# Patient Record
Sex: MALE | Race: WHITE | NOT HISPANIC OR LATINO | Employment: FULL TIME | URBAN - METROPOLITAN AREA
[De-identification: names, ages, dates, MRNs, and addresses within clinical notes are randomized per-mention and may not be internally consistent; named-entity substitution may affect disease eponyms.]

---

## 2018-09-18 ENCOUNTER — TRANSFERRED RECORDS (OUTPATIENT)
Dept: HEALTH INFORMATION MANAGEMENT | Facility: CLINIC | Age: 28
End: 2018-09-18

## 2019-03-08 ENCOUNTER — TRANSFERRED RECORDS (OUTPATIENT)
Dept: HEALTH INFORMATION MANAGEMENT | Facility: CLINIC | Age: 29
End: 2019-03-08

## 2019-11-21 ENCOUNTER — OFFICE VISIT (OUTPATIENT)
Dept: FAMILY MEDICINE | Facility: CLINIC | Age: 29
End: 2019-11-21
Payer: COMMERCIAL

## 2019-11-21 VITALS
SYSTOLIC BLOOD PRESSURE: 135 MMHG | BODY MASS INDEX: 32.48 KG/M2 | RESPIRATION RATE: 16 BRPM | OXYGEN SATURATION: 98 % | DIASTOLIC BLOOD PRESSURE: 84 MMHG | HEIGHT: 71 IN | TEMPERATURE: 98.1 F | WEIGHT: 232 LBS | HEART RATE: 55 BPM

## 2019-11-21 DIAGNOSIS — Z23 NEED FOR VACCINATION: ICD-10-CM

## 2019-11-21 DIAGNOSIS — Z13.6 CARDIOVASCULAR SCREENING; LDL GOAL LESS THAN 160: ICD-10-CM

## 2019-11-21 DIAGNOSIS — Z00.00 ROUTINE GENERAL MEDICAL EXAMINATION AT A HEALTH CARE FACILITY: Primary | ICD-10-CM

## 2019-11-21 LAB
ALBUMIN SERPL-MCNC: 4.3 G/DL (ref 3.4–5)
ALP SERPL-CCNC: 42 U/L (ref 40–150)
ALT SERPL W P-5'-P-CCNC: 67 U/L (ref 0–70)
ANION GAP SERPL CALCULATED.3IONS-SCNC: 11 MMOL/L (ref 3–14)
AST SERPL W P-5'-P-CCNC: 35 U/L (ref 0–45)
BASOPHILS # BLD AUTO: 0.1 10E9/L (ref 0–0.2)
BASOPHILS NFR BLD AUTO: 0.9 %
BILIRUB SERPL-MCNC: 0.8 MG/DL (ref 0.2–1.3)
BUN SERPL-MCNC: 10 MG/DL (ref 7–30)
CALCIUM SERPL-MCNC: 9.2 MG/DL (ref 8.5–10.1)
CHLORIDE SERPL-SCNC: 109 MMOL/L (ref 94–109)
CHOLEST SERPL-MCNC: 166 MG/DL
CO2 SERPL-SCNC: 20 MMOL/L (ref 20–32)
CREAT SERPL-MCNC: 1.28 MG/DL (ref 0.66–1.25)
DIFFERENTIAL METHOD BLD: ABNORMAL
EOSINOPHIL # BLD AUTO: 0.2 10E9/L (ref 0–0.7)
EOSINOPHIL NFR BLD AUTO: 3.7 %
ERYTHROCYTE [DISTWIDTH] IN BLOOD BY AUTOMATED COUNT: 12.8 % (ref 10–15)
GFR SERPL CREATININE-BSD FRML MDRD: 76 ML/MIN/{1.73_M2}
GLUCOSE SERPL-MCNC: 84 MG/DL (ref 70–99)
HCT VFR BLD AUTO: 42.7 % (ref 40–53)
HDLC SERPL-MCNC: 50 MG/DL
HGB BLD-MCNC: 14 G/DL (ref 13.3–17.7)
LDLC SERPL CALC-MCNC: 103 MG/DL
LYMPHOCYTES # BLD AUTO: 2.8 10E9/L (ref 0.8–5.3)
LYMPHOCYTES NFR BLD AUTO: 48.8 %
MCH RBC QN AUTO: 32 PG (ref 26.5–33)
MCHC RBC AUTO-ENTMCNC: 32.8 G/DL (ref 31.5–36.5)
MCV RBC AUTO: 98 FL (ref 78–100)
MONOCYTES # BLD AUTO: 0.7 10E9/L (ref 0–1.3)
MONOCYTES NFR BLD AUTO: 11.5 %
NEUTROPHILS # BLD AUTO: 2 10E9/L (ref 1.6–8.3)
NEUTROPHILS NFR BLD AUTO: 35.1 %
NONHDLC SERPL-MCNC: 116 MG/DL
PLATELET # BLD AUTO: 219 10E9/L (ref 150–450)
POTASSIUM SERPL-SCNC: 3.8 MMOL/L (ref 3.4–5.3)
PROT SERPL-MCNC: 7 G/DL (ref 6.8–8.8)
RBC # BLD AUTO: 4.38 10E12/L (ref 4.4–5.9)
SODIUM SERPL-SCNC: 140 MMOL/L (ref 133–144)
TRIGL SERPL-MCNC: 63 MG/DL
WBC # BLD AUTO: 5.6 10E9/L (ref 4–11)

## 2019-11-21 PROCEDURE — 90471 IMMUNIZATION ADMIN: CPT | Performed by: PHYSICIAN ASSISTANT

## 2019-11-21 PROCEDURE — 80053 COMPREHEN METABOLIC PANEL: CPT | Performed by: PHYSICIAN ASSISTANT

## 2019-11-21 PROCEDURE — 36415 COLL VENOUS BLD VENIPUNCTURE: CPT | Performed by: PHYSICIAN ASSISTANT

## 2019-11-21 PROCEDURE — 90686 IIV4 VACC NO PRSV 0.5 ML IM: CPT | Performed by: PHYSICIAN ASSISTANT

## 2019-11-21 PROCEDURE — 80061 LIPID PANEL: CPT | Performed by: PHYSICIAN ASSISTANT

## 2019-11-21 PROCEDURE — 99385 PREV VISIT NEW AGE 18-39: CPT | Mod: 25 | Performed by: PHYSICIAN ASSISTANT

## 2019-11-21 PROCEDURE — 85025 COMPLETE CBC W/AUTO DIFF WBC: CPT | Performed by: PHYSICIAN ASSISTANT

## 2019-11-21 ASSESSMENT — MIFFLIN-ST. JEOR: SCORE: 2049.48

## 2019-11-21 NOTE — NURSING NOTE
"Chief Complaint   Patient presents with     Physical     /84 (BP Location: Right arm, Patient Position: Sitting, Cuff Size: Adult Large)   Pulse 55   Temp 98.1  F (36.7  C) (Oral)   Resp 16   Ht 1.803 m (5' 11\")   Wt 105.2 kg (232 lb)   SpO2 98%   BMI 32.36 kg/m   Estimated body mass index is 32.36 kg/m  as calculated from the following:    Height as of this encounter: 1.803 m (5' 11\").    Weight as of this encounter: 105.2 kg (232 lb).  bp completed using cuff size: large       Health Maintenance addressed:  Flu    Possibly completing today per provider review.    Jesse Lorenzo CMA, MA     "

## 2019-11-21 NOTE — PROGRESS NOTES
"  3  SUBJECTIVE:   CC: Abdifatah Silva is an 29 year old male who presents for preventive health visit.     Healthy Habits:    Do you get at least three servings of calcium containing foods daily (dairy, green leafy vegetables, etc.)? { :165773::\"yes\"}    Amount of exercise or daily activities, outside of work: { :437397}    Problems taking medications regularly { :676288::\"No\"}    Medication side effects: { :423630::\"No\"}    Have you had an eye exam in the past two years? { :449195}    Do you see a dentist twice per year? { :175227}    Do you have sleep apnea, excessive snoring or daytime drowsiness?{ :793685}  {Outside tests to abstract? :579625}    {additional problems to add (Optional):955739}    Today's PHQ-2 Score:   PHQ-2 ( 1999 [employer]) 11/21/2019   Q1: Little interest or pleasure in doing things 0   Q2: Feeling down, depressed or hopeless 0   PHQ-2 Score 0   Q1: Little interest or pleasure in doing things Not at all   Q2: Feeling down, depressed or hopeless Not at all   PHQ-2 Score 0     {PHQ-2 LOOK IN ASSESSMENTS (Optional) :361246}  Abuse: Current or Past(Physical, Sexual or Emotional)- {YES/NO/NA:654827}  Do you feel safe in your environment? {YES/NO/NA:960648}        Social History     Tobacco Use     Smoking status: Not on file   Substance Use Topics     Alcohol use: Not on file     If you drink alcohol do you typically have >3 drinks per day or >7 drinks per week? {ETOH :335432}                      Last PSA: No results found for: PSA    Reviewed orders with patient. Reviewed health maintenance and updated orders accordingly - {Yes/No:261720::\"Yes\"}  {Chronicprobdata (Optional):195575}    Reviewed and updated as needed this visit by clinical staff         Reviewed and updated as needed this visit by Provider        {HISTORY OPTIONS (Optional):973077}    ROS:  { :380121::\"CONSTITUTIONAL: NEGATIVE for fever, chills, change in weight\",\"INTEGUMENTARY/SKIN: NEGATIVE for worrisome rashes, moles or " "lesions\",\"EYES: NEGATIVE for vision changes or irritation\",\"ENT: NEGATIVE for ear, mouth and throat problems\",\"RESP: NEGATIVE for significant cough or SOB\",\"CV: NEGATIVE for chest pain, palpitations or peripheral edema\",\"GI: NEGATIVE for nausea, abdominal pain, heartburn, or change in bowel habits\",\" male: negative for dysuria, hematuria, decreased urinary stream, erectile dysfunction, urethral discharge\",\"MUSCULOSKELETAL: NEGATIVE for significant arthralgias or myalgia\",\"NEURO: NEGATIVE for weakness, dizziness or paresthesias\",\"PSYCHIATRIC: NEGATIVE for changes in mood or affect\"}    OBJECTIVE:   There were no vitals taken for this visit.  EXAM:  {Exam Choices:740891}    {Diagnostic Test Results (Optional):336470::\"Diagnostic Test Results:\",\"Labs reviewed in Epic\"}    ASSESSMENT/PLAN:   {Diag Picklist:843684}    COUNSELING:  {MALE COUNSELING MESSAGES:728053::\"Reviewed preventive health counseling, as reflected in patient instructions\"}    There is no height or weight on file to calculate BMI.    {Weight Management Plan (ACO) Complete if BMI is abnormal-  Ages 18-64  BMI >24.9.  Age 65+ with BMI <23 or >30 (Optional):741101}     has no history on file for tobacco.  {Tobacco Cessation -- Complete if patient is a smoker (Optional):852334}    Counseling Resources:  ATP IV Guidelines  Pooled Cohorts Equation Calculator  FRAX Risk Assessment  ICSI Preventive Guidelines  Dietary Guidelines for Americans, 2010  USDA's MyPlate  ASA Prophylaxis  Lung CA Screening    Gregg Feliciano PA-C  [facility]  "

## 2019-11-21 NOTE — PROGRESS NOTES
SUBJECTIVE:   CC: Abdifatah Silva is an 29 year old male who presents for preventative health visit.     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  Healthy male, been a few years since last exam.  No real concerns.        Today's PHQ-2 Score:   PHQ-2 ( 1999 Pfizer) 11/21/2019   Q1: Little interest or pleasure in doing things 0   Q2: Feeling down, depressed or hopeless 0   PHQ-2 Score 0   Q1: Little interest or pleasure in doing things Not at all   Q2: Feeling down, depressed or hopeless Not at all   PHQ-2 Score 0       Abuse: Current or Past(Physical, Sexual or Emotional)- No  Do you feel safe in your environment? Yes        Social History     Tobacco Use     Smoking status: Never Smoker     Smokeless tobacco: Never Used   Substance Use Topics     Alcohol use: Yes     If you drink alcohol do you typically have >3 drinks per day or >7 drinks per week? No    Alcohol Use 11/21/2019   Prescreen: >3 drinks/day or >7 drinks/week? Yes   Prescreen: >3 drinks/day or >7 drinks/week? -   AUDIT SCORE  5     AUDIT - Alcohol Use Disorders Identification Test - Reproduced from the World Health Organization Audit 2001 (Second Edition) 11/21/2019   1.  How often do you have a drink containing alcohol? 2 to 4 times a month   2.  How many drinks containing alcohol do you have on a typical day when you are drinking? 3 or 4   3.  How often do you have five or more drinks on one occasion? Monthly   4.  How often during the last year have you found that you were not able to stop drinking once you had started?  Never   5.  How often during the last year have you failed to do what was normally expected of you because of drinking? Never   6.  How often during the last year have you needed a first drink in the morning to get yourself going after a heavy drinking session? Never   7.  How often during the last year have you had a feeling of guilt or remorse after drinking? Never   8.  How often during the last year have you been unable to remember what happened the night before because of your drinking? Never   9.  Have you or someone else been injured because of your drinking? No   10. Has a relative, friend, doctor or other health care worker been concerned about your drinking or suggested you cut down? No   TOTAL SCORE 5       Last PSA: No results found for: PSA    Reviewed orders with patient. Reviewed health maintenance and updated orders accordingly - Yes  BP Readings from Last 3 Encounters:   11/21/19 135/84    Wt Readings from Last 3 Encounters:   11/21/19 105.2 kg (232 lb)                    Reviewed and updated as needed this visit by clinical staff  Tobacco  Allergies  Meds  Soc Hx        Reviewed and updated as needed this visit by Provider            Review of Systems  CONSTITUTIONAL: NEGATIVE for fever, chills, change in weight  INTEGUMENTARY/SKIN: NEGATIVE for worrisome rashes, moles or lesions  EYES: NEGATIVE for vision changes or irritation  ENT: NEGATIVE for ear, mouth and throat problems  RESP: NEGATIVE for significant cough or SOB  CV: NEGATIVE for chest pain, palpitations or peripheral edema  GI: NEGATIVE for nausea, abdominal pain, heartburn, or change in bowel habits   male: negative for dysuria, hematuria, decreased urinary stream, erectile dysfunction, urethral discharge  MUSCULOSKELETAL: NEGATIVE for significant arthralgias or myalgia  NEURO: NEGATIVE for weakness, dizziness or paresthesias  PSYCHIATRIC: NEGATIVE for changes in mood or affect    OBJECTIVE:   /84 (BP Location: Right arm,  "Patient Position: Sitting, Cuff Size: Adult Large)   Pulse 55   Temp 98.1  F (36.7  C) (Oral)   Resp 16   Ht 1.803 m (5' 11\")   Wt 105.2 kg (232 lb)   SpO2 98%   BMI 32.36 kg/m      Physical Exam  GENERAL: alert and no distress  EYES: Eyes grossly normal to inspection, PERRL and conjunctivae and sclerae normal  HENT: ear canals and TM's normal, nose and mouth without ulcers or lesions  NECK: no adenopathy, no asymmetry, masses, or scars and thyroid normal to palpation  RESP: lungs clear to auscultation - no rales, rhonchi or wheezes  CV: regular rate and rhythm, normal S1 S2, no S3 or S4, no murmur, click or rub, no peripheral edema and peripheral pulses strong  ABDOMEN: soft, nontender, no hepatosplenomegaly, no masses and bowel sounds normal  MS: no gross musculoskeletal defects noted, no edema  SKIN: no suspicious lesions or rashes  NEURO: Normal strength and tone, mentation intact and speech normal  PSYCH: mentation appears normal, affect normal/bright    Diagnostic Test Results:  Labs reviewed in Epic    ASSESSMENT/PLAN:   1. Routine general medical examination at a health care facility    - CBC with platelets differential  - Comprehensive metabolic panel    2. CARDIOVASCULAR SCREENING; LDL GOAL LESS THAN 160    - Lipid panel reflex to direct LDL Fasting    3. Need for vaccination    - INFLUENZA VACCINE IM > 6 MONTHS VALENT IIV4 [69298]  - ADMIN 1st VACCINE    COUNSELING:   Reviewed preventive health counseling, as reflected in patient instructions       Regular exercise       Healthy diet/nutrition       Immunizations    Vaccinated for: Influenza          Estimated body mass index is 32.36 kg/m  as calculated from the following:    Height as of this encounter: 1.803 m (5' 11\").    Weight as of this encounter: 105.2 kg (232 lb).     Weight management plan: Discussed healthy diet and exercise guidelines     reports that he has never smoked. He has never used smokeless tobacco.      Counseling " Resources:  ATP IV Guidelines  Pooled Cohorts Equation Calculator  FRAX Risk Assessment  ICSI Preventive Guidelines  Dietary Guidelines for Americans, 2010  USDA's MyPlate  ASA Prophylaxis  Lung CA Screening    Gregg Feliciano PA-C  [facility]

## 2019-11-22 ENCOUNTER — TELEPHONE (OUTPATIENT)
Dept: FAMILY MEDICINE | Facility: CLINIC | Age: 29
End: 2019-11-22

## 2019-11-22 NOTE — RESULT ENCOUNTER NOTE
Please call with results.    Overall, labs look just fine.  Creatinine is up just a bit.  This is not at a concerning level, but would limit all nsaids and overall keep well hydrate.  We will recheck this next year.      Familia Feliciano PA-C

## 2019-11-22 NOTE — TELEPHONE ENCOUNTER
Gregg Feliciano PA-C  P Up Triage             Please call with results.     Overall, labs look just fine.  Creatinine is up just a bit.  This is not at a concerning level, but would limit all nsaids and overall keep well hydrate.  We will recheck this next year.       Familia Feliciano PA-C

## 2021-01-04 ENCOUNTER — HEALTH MAINTENANCE LETTER (OUTPATIENT)
Age: 31
End: 2021-01-04

## 2021-10-10 ENCOUNTER — HEALTH MAINTENANCE LETTER (OUTPATIENT)
Age: 31
End: 2021-10-10

## 2022-01-30 ENCOUNTER — HEALTH MAINTENANCE LETTER (OUTPATIENT)
Age: 32
End: 2022-01-30

## 2022-05-26 ENCOUNTER — OFFICE VISIT (OUTPATIENT)
Dept: FAMILY MEDICINE | Facility: CLINIC | Age: 32
End: 2022-05-26
Payer: COMMERCIAL

## 2022-05-26 VITALS
RESPIRATION RATE: 18 BRPM | WEIGHT: 232 LBS | OXYGEN SATURATION: 100 % | SYSTOLIC BLOOD PRESSURE: 123 MMHG | BODY MASS INDEX: 30.75 KG/M2 | HEART RATE: 51 BPM | DIASTOLIC BLOOD PRESSURE: 79 MMHG | TEMPERATURE: 97.6 F | HEIGHT: 73 IN

## 2022-05-26 DIAGNOSIS — M25.511 RIGHT SHOULDER PAIN, UNSPECIFIED CHRONICITY: Primary | ICD-10-CM

## 2022-05-26 PROCEDURE — 99203 OFFICE O/P NEW LOW 30 MIN: CPT | Performed by: NURSE PRACTITIONER

## 2022-05-26 RX ORDER — NAPROXEN 500 MG/1
500 TABLET ORAL 2 TIMES DAILY PRN
Qty: 30 TABLET | Refills: 1 | Status: SHIPPED | OUTPATIENT
Start: 2022-05-26

## 2022-05-26 ASSESSMENT — ANXIETY QUESTIONNAIRES
GAD7 TOTAL SCORE: 1
2. NOT BEING ABLE TO STOP OR CONTROL WORRYING: NOT AT ALL
1. FEELING NERVOUS, ANXIOUS, OR ON EDGE: NOT AT ALL
3. WORRYING TOO MUCH ABOUT DIFFERENT THINGS: NOT AT ALL
5. BEING SO RESTLESS THAT IT IS HARD TO SIT STILL: NOT AT ALL
IF YOU CHECKED OFF ANY PROBLEMS ON THIS QUESTIONNAIRE, HOW DIFFICULT HAVE THESE PROBLEMS MADE IT FOR YOU TO DO YOUR WORK, TAKE CARE OF THINGS AT HOME, OR GET ALONG WITH OTHER PEOPLE: NOT DIFFICULT AT ALL
6. BECOMING EASILY ANNOYED OR IRRITABLE: SEVERAL DAYS
GAD7 TOTAL SCORE: 1
7. FEELING AFRAID AS IF SOMETHING AWFUL MIGHT HAPPEN: NOT AT ALL

## 2022-05-26 ASSESSMENT — PATIENT HEALTH QUESTIONNAIRE - PHQ9
5. POOR APPETITE OR OVEREATING: NOT AT ALL
SUM OF ALL RESPONSES TO PHQ QUESTIONS 1-9: 8

## 2022-05-26 ASSESSMENT — PAIN SCALES - GENERAL: PAINLEVEL: MODERATE PAIN (5)

## 2022-05-26 NOTE — PROGRESS NOTES
Michelle Gardiner is a 32 year old who presents for the following health issues     PT c/o Right Shoulder progressively getting worse reaching behind back or out in front   History of Present Illness       Reason for visit:  Shoulder pain  Symptom onset:  3-4 weeks ago    He eats 4 or more servings of fruits and vegetables daily.He consumes 0 sweetened beverage(s) daily.He exercises with enough effort to increase his heart rate 30 to 60 minutes per day.  He exercises with enough effort to increase his heart rate 4 days per week. He is missing 1 dose(s) of medications per week.  He is not taking prescribed medications regularly due to remembering to take.       Joint or Musculoskeletal Pain  Duration of complaint: 3 to 4 weeks    He lifts weights and plays baseball   Description:   Location: right shoulder  Character: Sharp  Intensity: 6/10  Progression of Symptoms: worse  Accompanying Signs & Symptoms: Other symptoms: none  History: Previous similar pain: no    Precipitating factors: Trauma or overuse: YES- no specific injury but does play baseball and lifts weights   Alleviating factors: Improved by: rest/inactivity and Ibuprofen  Therapies Tried and outcome: if reaches back or forward pain will start but not at rest         Depression and Anxiety Follow-Up    How are you doing with your depression since your last visit? Has had a hx of some depression in the past and had seen a counselor before     Would like to do that again     How are you doing with your anxiety since your last visit?  No change    Are you having other symptoms that might be associated with depression or anxiety? No    Have you had a significant life event? No     Do you have any concerns with your use of alcohol or other drugs? No    Social History     Tobacco Use     Smoking status: Never Smoker     Smokeless tobacco: Never Used   Substance Use Topics     Alcohol use: Yes     Drug use: Never     No flowsheet data found.  No flowsheet  data found.  Last PHQ-9 5/26/2022   1.  Little interest or pleasure in doing things 1   2.  Feeling down, depressed, or hopeless 1   3.  Trouble falling or staying asleep, or sleeping too much 2   4.  Feeling tired or having little energy 2   5.  Poor appetite or overeating 1   6.  Feeling bad about yourself 1   7.  Trouble concentrating 0   8.  Moving slowly or restless 0   Q9: Thoughts of better off dead/self-harm past 2 weeks 0   PHQ-9 Total Score 8   Difficulty at work, home, or with people Not difficult at all     WILY-7  5/26/2022   1. Feeling nervous, anxious, or on edge 0   2. Not being able to stop or control worrying 0   3. Worrying too much about different things 0   4. Trouble relaxing 0   5. Being so restless that it is hard to sit still 0   6. Becoming easily annoyed or irritable 1   7. Feeling afraid, as if something awful might happen 0   WILY-7 Total Score 1   If you checked any problems, how difficult have they made it for you to do your work, take care of things at home, or get along with other people? Not difficult at all       Suicide Assessment Five-step Evaluation and Treatment (SAFE-T)  Labs reviewed in EPIC  BP Readings from Last 3 Encounters:   05/26/22 123/79   11/21/19 135/84    Wt Readings from Last 3 Encounters:   05/26/22 105.2 kg (232 lb)   11/21/19 105.2 kg (232 lb)                  There is no problem list on file for this patient.    History reviewed. No pertinent surgical history.    Social History     Tobacco Use     Smoking status: Never Smoker     Smokeless tobacco: Never Used   Substance Use Topics     Alcohol use: Yes     Family History   Problem Relation Age of Onset     Arthritis Mother      Cancer Mother         Bone cancer      Hypertension Father      Allergies Father      Hyperlipidemia Father      Alzheimer Disease Maternal Grandmother      Coronary Artery Disease Paternal Grandfather      Hypertension Paternal Grandfather      Hyperlipidemia Paternal Grandfather       "    No current outpatient medications on file.     No Known Allergies          Review of Systems   Constitutional, HEENT, cardiovascular, pulmonary, gi and gu systems are negative, except as otherwise noted.      Objective    /79 (BP Location: Right arm, Patient Position: Sitting, Cuff Size: Adult Large)   Pulse 51   Temp 97.6  F (36.4  C) (Oral)   Resp 18   Ht 1.85 m (6' 0.84\")   Wt 105.2 kg (232 lb)   SpO2 100%   BMI 30.75 kg/m    Body mass index is 30.75 kg/m .  Physical Exam   GENERAL APPEARANCE: alert, active and no distress  HENT: oral mucous membranes moist  NECK: no adenopathy  RESP: lungs clear to auscultation - no rales, rhonchi or wheezes  CV: regular rates and rhythm  MS: extremities normal- no gross deformities noted and peripheral pulses normal  ORTHO:   SHOULDER Exam-Right   Inspection: no swelling, no bruising, no discoloration, no obvious deformity, no asymmetry, no glenohumeral joint anterior bulge, no distal clavicle elevation, no muscle atrophy, no scapular winging   Tenderness of: SC joint- no, clavicle(prox-mid)- no, clavicle-(mid-distal)- no, AC joint- no, acromion- no, anterior capsule- no, prox bicep tendon- no, greater tuberosity- no, prox humerus- no, supraspinatous- no, infraspinatous- no, superior trapezious- no, rhomboids- no   Range of Motion: Active- limited due to pain.  Range of Motion: Passive- limited due to pain.   Strength: forward flexion- 5/5, abduction- 5/5, internal rotation- 5/5, external rotation- 5/5 and bicep- full           SKIN: no suspicious lesions or rashes  PSYCH: mentation appears normal and affect normal/bright  MENTAL STATUS EXAM:  Appearance/Behavior: No apparent distress, Casually groomed and Dressed appropriately for weather  Speech: Normal  Mood/Affect: normal affect  Insight: Adequate    Recent Results (from the past 744 hour(s))   XR Shoulder Right G/E 3 Views    Narrative    XR SHOULDER RIGHT G/E 3 VIEWS 5/26/2022 10:23 AM     HISTORY: Right " "shoulder pain, unspecified chronicity    COMPARISON: None.      Impression    IMPRESSION: No fractures are evident. Normal glenohumeral alignment.  The acromioclavicular joint is unremarkable.     LAUREN SALVADOR MD         SYSTEM ID:  [id number]       Assessment & Plan     Right shoulder pain, unspecified chronicity  Symptomatic therapy suggested: .  prescription for NSAID given, referral to Physical Therapy  - XR Shoulder Right G/E 3 Views  Will follow up and/or notify patient of  results via My Chart to determine further need for followup  - naproxen (NAPROSYN) 500 MG tablet  Dispense: 30 tablet; Refill: 1  - Physical Therapy Referral      Ordering of each unique test  Prescription drug management   Time spent doing chart review, history and exam, documentation and further activities per the note       BMI:   Estimated body mass index is 30.75 kg/m  as calculated from the following:    Height as of this encounter: 1.85 m (6' 0.84\").    Weight as of this encounter: 105.2 kg (232 lb).       See Patient Instructions  Patient Instructions     PLAN:   1.   Symptomatic therapy suggested:   prescription for NSAID given, referral to Physical Therapy    2.  Orders Placed This Encounter   Medications     naproxen (NAPROSYN) 500 MG tablet     Sig: Take 1 tablet (500 mg) by mouth 2 times daily as needed     Dispense:  30 tablet     Refill:  1       Orders Placed This Encounter   Procedures     REVIEW OF HEALTH MAINTENANCE PROTOCOL ORDERS     XR Shoulder Right G/E 3 Views     Physical Therapy Referral         3. Patient needs to follow up in if no improvement,or sooner if worsening of symptoms or other symptoms develop.  [facility]     FURTHER TESTING:       - right shoulder xray   Will follow up and/or notify patient of  results via My Chart to determine further need for followup  Follow up in 1 month.        Return in about 1 month (around 6/26/2022), or " if symptoms worsen or fail to improve.    DARRYL Elias CNP  [facility]

## 2022-06-07 ENCOUNTER — TELEPHONE (OUTPATIENT)
Dept: BEHAVIORAL HEALTH | Facility: CLINIC | Age: 32
End: 2022-06-07
Payer: COMMERCIAL

## 2022-06-07 NOTE — TELEPHONE ENCOUNTER
Reached out to pt to offer [facility] [place] appt per the request of DARRYL Man.Left voicemail with behavioral intakes number for scheduling.

## 2022-06-10 ENCOUNTER — THERAPY VISIT (OUTPATIENT)
Dept: PHYSICAL THERAPY | Facility: CLINIC | Age: 32
End: 2022-06-10
Attending: NURSE PRACTITIONER
Payer: COMMERCIAL

## 2022-06-10 DIAGNOSIS — M25.511 RIGHT SHOULDER PAIN, UNSPECIFIED CHRONICITY: ICD-10-CM

## 2022-06-10 PROCEDURE — 97110 THERAPEUTIC EXERCISES: CPT | Mod: GP | Performed by: PHYSICAL THERAPIST

## 2022-06-10 PROCEDURE — 97161 PT EVAL LOW COMPLEX 20 MIN: CPT | Mod: GP | Performed by: PHYSICAL THERAPIST

## 2022-06-10 NOTE — PROGRESS NOTES
Physical Therapy Initial Evaluation  Subjective:  The history is provided by the patient. No  was used.   Patient Health History  Abdifatah Silva being seen for R shoulder pain, random pain that started one day.     Problem began: 4/29/2022.   Problem occurred: unknown   Pain is reported as 0/10 on pain scale.  General health as reported by patient is good.  Pertinent medical history includes: overweight.   Red flags:  None as reported by patient.  Medical allergies: none.   Surgeries include:  Other. Other surgery history details: appendectomy.    Current medications:  Other. Other medications details: finisteride.    Current occupation is .   Primary job tasks include:  Computer work.                  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 .         Type of problem:  Right shoulder.    This is a new condition.  Condition occurred with:  Unknown cause.  Where condition occurred: for unknown reasons.  Site of Pain: AC joint, posterior shoulder.  Pain is described as sharp and is intermittent.  Radiates to: none. Pain timing: worse with specific motions (IR, flexion)  Since onset symptoms are gradually improving (much better the last few days).  Associated with: soreness. Symptoms are exacerbated by using arm behind back, using arm overhead and lifting  and relieved by ice, heat and NSAID's (avoiding lifting heavy weights).    There was none improvement following previous treatment.  Restrictions due to condition include:  Working in normal job without restrictions.  Barriers include:  None as reported by patient.                        Objective:  System                   Shoulder Evaluation:  ROM:  AROM:    Flexion:  Right:  Full ROM, tightness last 20 degrees  Extension: Right: normal, pain free  Abduction:   Right:  Full ROM, tightness last 20 degrees    Internal Rotation:  Left:  To base of scaupla    Right:  To base of scapula    Horizontal Abduction:  Right:  Normal, pain free  Horizontal Adduction:  Right:  Normal, pain free              Pain: Only mild pain with end range IR today    Strength:    Flexion: Left:5/5   Pain:    Right: 5/5     Pain:   Extension:  Left: 5/5    Pain:    Right: 5/5    Pain:  Abduction:  Left: 5/5  Pain:    Right: 5/5     Pain:    Internal Rotation:  Left:5/5     Pain:    Right: 5/5     Pain:  External Rotation:   Left:5/5     Pain:             Stability Testing:  normal          Mobility Tests:  Mobility wnl shoulder: slight decrease in upward rotation for R scapula into abduction when compared to left.  Glenohumeral anterior right:  Normal  Glenohumeral posterior right:  Hypomobile  Glenohumeral inferior right:  Normal    Acromioclavicular right:  Normal      Sternoclavicular right:  Normal  Scapulohumeral rhythm right:  Hypomobile                                   General     ROS    Assessment/Plan:    Patient is a 32 year old male with right side shoulder complaints.    Patient has the following significant findings with corresponding treatment plan.                Diagnosis 1:  R shoulder pain, AC joint pain  Pain -  hot/cold therapy, manual therapy, self management, education and home program  Decreased ROM/flexibility - manual therapy, therapeutic exercise and home program  Decreased joint mobility - manual therapy, therapeutic exercise and home program  Decreased strength - therapeutic exercise, therapeutic activities and home program  Impaired muscle performance - neuro re-education and home program    Therapy Evaluation Codes:   Cumulative Therapy Evaluation is: Low complexity.    Previous and current functional limitations:  (See Goal Flow Sheet for this information)    Short term and Long term goals: (See Goal Flow Sheet for this information)     Communication ability:  Patient  appears to be able to clearly communicate and understand verbal and written communication and follow directions correctly.  Treatment Explanation - The following has been discussed with the patient:   RX ordered/plan of care  Anticipated outcomes  Possible risks and side effects  This patient would benefit from PT intervention to resume normal activities.   Rehab potential is excellent.    Frequency:  2 X a month, once daily  Duration:  for 2 months  Discharge Plan:  Achieve all LTG.  Independent in home treatment program.  Reach maximal therapeutic benefit.    Please refer to the daily flowsheet for treatment today, total treatment time and time spent performing 1:1 timed codes.

## 2022-09-18 ENCOUNTER — HEALTH MAINTENANCE LETTER (OUTPATIENT)
Age: 32
End: 2022-09-18

## 2023-05-07 ENCOUNTER — HEALTH MAINTENANCE LETTER (OUTPATIENT)
Age: 33
End: 2023-05-07

## 2024-07-14 ENCOUNTER — HEALTH MAINTENANCE LETTER (OUTPATIENT)
Age: 34
End: 2024-07-14

## 2024-11-09 NOTE — PATIENT INSTRUCTIONS
PLAN:   1.   Symptomatic therapy suggested:   prescription for NSAID given, referral to Physical Therapy    2.  Orders Placed This Encounter   Medications    naproxen (NAPROSYN) 500 MG tablet     Sig: Take 1 tablet (500 mg) by mouth 2 times daily as needed     Dispense:  30 tablet     Refill:  1       Orders Placed This Encounter   Procedures    REVIEW OF HEALTH MAINTENANCE PROTOCOL ORDERS    XR Shoulder Right G/E 3 Views    Physical Therapy Referral         3. Patient needs to follow up in if no improvement,or sooner if worsening of symptoms or other symptoms develop.  Initiated consultation with CINDY Thompson  for mental health counseling.     FURTHER TESTING:       - right shoulder xray   Will follow up and/or notify patient of  results via My Chart to determine further need for followup  Follow up in 1 month.     -3 -3